# Patient Record
Sex: FEMALE
[De-identification: names, ages, dates, MRNs, and addresses within clinical notes are randomized per-mention and may not be internally consistent; named-entity substitution may affect disease eponyms.]

---

## 2020-08-28 ENCOUNTER — HOSPITAL ENCOUNTER (EMERGENCY)
Dept: HOSPITAL 41 - JD.ED | Age: 55
Discharge: HOME | End: 2020-08-28
Payer: COMMERCIAL

## 2020-08-28 DIAGNOSIS — T39.315A: ICD-10-CM

## 2020-08-28 DIAGNOSIS — I10: ICD-10-CM

## 2020-08-28 DIAGNOSIS — Z79.899: ICD-10-CM

## 2020-08-28 DIAGNOSIS — R51: Primary | ICD-10-CM

## 2020-08-28 NOTE — EDM.PDOC
ED HPI GENERAL MEDICAL PROBLEM





- General


Chief Complaint: Headache


Stated Complaint: HEADACHE/LETHARGY


Time Seen by Provider: 08/28/20 13:06





- History of Present Illness


INITIAL COMMENTS - FREE TEXT/NARRATIVE: 





55-year-old female presents the emergency room with headaches.  She is also 

having some intermittent lethargy.





Patient is visiting here from [place].  She complains of headaches for the last

2 weeks these occur mostly at night.  The patient has been taking Naprosyn 500 

mg twice daily for about the last year for shoulder pain.  The headaches are not

associated with any nausea or vomiting seem to be bilateral she does not have 

photophobia with this but they keep her up at night.  The patient also has a 

history of hypertension she was given Norvasc 10 mg had this filled about a year

ago for a months worth and most of them are still in the bottle however her 

blood pressure is not dangerously high here in the emergency room.  She is not 

had any chest pain breathing difficulties or shortness of breath.  Her headaches

are nightly.


Treatments PTA: Reports: Other (see below)


  ** Headache


Pain Score (Numeric/FACES): 5





- Related Data


                                    Allergies











Allergy/AdvReac Type Severity Reaction Status Date / Time


 


No Known Allergies Allergy   Verified 08/28/20 12:39











Home Meds: 


                                    Home Meds





Naproxen [Naprosyn] 500 mg PO BID PRN 08/28/20 [History]


amLODIPine [Norvasc] 10 mg PO DAILY 08/28/20 [History]











Past Medical History


Cardiovascular History: Reports: Hypertension


Neurological History: Reports: Headaches, Chronic





Social & Family History





- Tobacco Use


Smoking Status *Q: Never Smoker





- Caffeine Use


Caffeine Use: Reports: Coffee





- Recreational Drug Use


Recreational Drug Use: No





ED ROS GENERAL





- Review of Systems


Review Of Systems: See Below


HEENT: Reports: No Symptoms


Respiratory: Reports: No Symptoms


Cardiovascular: Reports: No Symptoms


GI/Abdominal: Reports: No Symptoms


Neurological: Reports: Headache


Psychiatric: Reports: No Symptoms





- Physical Exam


Exam: See Below


Exam Limited By: No Limitations


General Appearance: Alert, No Apparent Distress


Eye Exam: Bilateral Eye: EOMI, Normal Inspection, PERRL


Ears: Normal External Exam, Normal Canal, Hearing Grossly Normal, Normal TMs


Nose: Normal Inspection, Normal Mucosa, No Blood


Throat/Mouth: Normal Inspection, Normal Lips, Normal Teeth, Normal Gums, Normal 

Oropharynx, Normal Voice, No Airway Compromise


Head Exam: Atraumatic, Normocephalic


Neck: Normal Inspection, Supple, Non-Tender, Full Range of Motion, Other (Some 

mild to moderate paraspinous muscle spasm in the paraspinous muscles in the neck

 pressing these especially at the insertion of the scalp does not elicit or make

 her headaches any worse)


Respiratory/Chest: No Respiratory Distress, Lungs Clear, Normal Breath Sounds, 

No Accessory Muscle Use, Chest Non-Tender


Cardiovascular: Regular Rate, Rhythm, No Edema, No Murmur


Neuro Exam (Abbreviated): Other (Cranial nerves II through XII grossly intact 

all muscle groups in the upper and lower extremities are equal and appropriate 

bilaterally deep tendon reflexes at the brachial radialis are equal and 

appropriate bilaterally cerebellar testing is entirely within normal limits.)





Course





- Vital Signs


Last Recorded V/S: 


                                Last Vital Signs











Temp  36.3 C   08/28/20 12:46


 


Pulse  88   08/28/20 12:46


 


Resp  20   08/28/20 12:46


 


BP  141/88 H  08/28/20 12:46


 


Pulse Ox  96   08/28/20 12:46














- Re-Assessments/Exams


Free Text/Narrative Re-Assessment/Exam: 





08/28/20 15:00


Presents the emergency room at a relatively asymptomatic state her blood 

pressure is stable here.  I believe she is probably developing rebound headaches

 secondary to long-term nonsteroidal anti-inflammatory medication usage.  I have

 recommended that she discontinue the Naprosyn based on her age Tylenol would be

 a more appropriate medication for reduce the risk of hypertension heart attack 

and stroke.  I have advised the patient to stop the Naprosyn and for the next 5 

days I want her to get some Tylenol extra strength take 2 4 times a day for 5 

days and then on an as-needed basis thereafter.  She needs to follow-up with her

 regular physician when she gets home.  In the meantime she can follow-up with 

the hospital clinic here or return to the emergency room.





Departure





- Departure


Time of Disposition: 15:02


Disposition: Home, Self-Care 01


Clinical Impression: 


 Analgesic rebound headache








- Discharge Information


Referrals: 


PCP,None [Primary Care Provider] - 


Forms:  ED Department Discharge


Additional Instructions: 


Return to the emergency room with any questions problems or worsening symptoms.





Stop the Naprosyn!





Start extra strength Tylenol 500 mg take 2 4 times a day.  After 5 days decrease

it to taking 2 on an as-needed basis.





Follow-up with your regular physician when you get home.





If you are still here in 1 week follow-up at the hospital clinic for recheck in 

1 week.  [phone]





Sepsis Event Note (ED)





- Evaluation


Sepsis Screening Result: No Definite Risk





- Focused Exam


Vital Signs: 


                                   Vital Signs











  Temp Pulse Resp BP Pulse Ox


 


 08/28/20 12:46  36.3 C  88  20  141/88 H  96